# Patient Record
Sex: MALE | Race: WHITE | NOT HISPANIC OR LATINO | Employment: OTHER | URBAN - NONMETROPOLITAN AREA
[De-identification: names, ages, dates, MRNs, and addresses within clinical notes are randomized per-mention and may not be internally consistent; named-entity substitution may affect disease eponyms.]

---

## 2020-02-03 ENCOUNTER — OFFICE VISIT (OUTPATIENT)
Dept: CARDIOLOGY | Facility: CLINIC | Age: 50
End: 2020-02-03

## 2020-02-03 VITALS
SYSTOLIC BLOOD PRESSURE: 139 MMHG | OXYGEN SATURATION: 94 % | HEART RATE: 91 BPM | DIASTOLIC BLOOD PRESSURE: 92 MMHG | HEIGHT: 72 IN | BODY MASS INDEX: 32.78 KG/M2 | WEIGHT: 242 LBS

## 2020-02-03 DIAGNOSIS — R00.2 PALPITATIONS: ICD-10-CM

## 2020-02-03 DIAGNOSIS — I71.21 ASCENDING AORTIC ANEURYSM (HCC): ICD-10-CM

## 2020-02-03 DIAGNOSIS — R06.02 SHORTNESS OF BREATH: Primary | ICD-10-CM

## 2020-02-03 DIAGNOSIS — R07.9 CHEST PAIN, UNSPECIFIED TYPE: ICD-10-CM

## 2020-02-03 PROCEDURE — 99204 OFFICE O/P NEW MOD 45 MIN: CPT | Performed by: PHYSICIAN ASSISTANT

## 2020-02-03 RX ORDER — NITROGLYCERIN 0.4 MG/1
TABLET SUBLINGUAL
Qty: 25 TABLET | Refills: 11 | Status: SHIPPED | OUTPATIENT
Start: 2020-02-03

## 2020-02-03 RX ORDER — TIZANIDINE 4 MG/1
4 TABLET ORAL NIGHTLY PRN
COMMUNITY

## 2020-02-03 RX ORDER — AMLODIPINE BESYLATE 5 MG/1
5 TABLET ORAL DAILY
Qty: 30 TABLET | Refills: 11 | Status: SHIPPED | OUTPATIENT
Start: 2020-02-03

## 2020-02-03 RX ORDER — TRAZODONE HYDROCHLORIDE 50 MG/1
50 TABLET ORAL TAKE AS DIRECTED
COMMUNITY

## 2020-02-03 RX ORDER — HYDROCODONE BITARTRATE AND ACETAMINOPHEN 10; 325 MG/1; MG/1
1 TABLET ORAL 4 TIMES DAILY
COMMUNITY
End: 2020-10-19

## 2020-02-03 RX ORDER — ALBUTEROL SULFATE 90 UG/1
2 AEROSOL, METERED RESPIRATORY (INHALATION) EVERY 4 HOURS PRN
COMMUNITY

## 2020-02-03 NOTE — PROGRESS NOTES
Problem list     Subjective   Amanuel Hayes is a 49 y.o. male     Chief Complaint   Patient presents with   • Palpitations     has AAA   • Shortness of Breath   • Chest Pain   • Syncope     dizzy feeling; will cough so hard will pass out       HPI  ***      Current Outpatient Medications on File Prior to Visit   Medication Sig Dispense Refill   • albuterol sulfate  (90 Base) MCG/ACT inhaler Inhale 2 puffs Every 4 (Four) Hours As Needed for Wheezing.     • Fluticasone Furoate-Vilanterol (BREO ELLIPTA) 100-25 MCG/INH inhaler Inhale 1 puff Daily.     • HYDROcodone-acetaminophen (NORCO)  MG per tablet Take 1 tablet by mouth 4 (Four) Times a Day.     • tiZANidine (ZANAFLEX) 4 MG tablet Take 4 mg by mouth At Night As Needed for Muscle Spasms.     • traZODone (DESYREL) 50 MG tablet Take 50 mg by mouth Take As Directed. Half a tab at HS     • Umeclidinium Bromide (INCRUSE ELLIPTA) 62.5 MCG/INH aerosol powder  Inhale.       No current facility-administered medications on file prior to visit.        Penicillins; Neosporin [neomycin-bacitracin zn-polymyx]; and Sulfa antibiotics    Past Medical History:   Diagnosis Date   • Aneurysm (CMS/[facility])    • Asthma    • Cancer (CMS/[facility])     has nodule on b/l lungs/    • Coma (CMS/[facility]) 1999   • COPD (chronic obstructive pulmonary disease) (CMS/[facility])    • Hypertension        Social History     Socioeconomic History   • Marital status:      Spouse name: Not on file   • Number of children: Not on file   • Years of education: Not on file   • Highest education level: Not on file   Tobacco Use   • Smoking status: Current Every Day Smoker     Packs/day: 1.00     Types: Cigarettes   • Smokeless tobacco: Never Used   Substance and Sexual Activity   • Alcohol use: Yes   • Drug use: Defer   • Sexual activity: Defer       Family History   Problem Relation Age of Onset   • Cancer Mother    • Heart failure Mother    • Cancer Father    • Cancer Maternal Aunt    • Cancer Maternal  "Uncle    • Cancer Paternal Aunt    • Cancer Paternal Uncle    • Cancer Maternal Grandmother    • Cancer Maternal Grandfather    • Cancer Paternal Grandmother    • Cancer Paternal Grandfather        Review of Systems    Objective   Vitals:    02/03/20 1509   BP: 139/92   BP Location: Left arm   Patient Position: Sitting   Cuff Size: Adult   Pulse: 91   SpO2: 94%   Weight: 110 kg (242 lb)   Height: 182.9 cm (72\")      /92 (BP Location: Left arm, Patient Position: Sitting, Cuff Size: Adult)   Pulse 91   Ht 182.9 cm (72\")   Wt 110 kg (242 lb)   SpO2 94% Comment: No 02  BMI 32.82 kg/m²     Lab Results (most recent)     None          Physical Exam    Procedure   Procedures       Assessment/Plan     Problems Addressed this Visit     None                     Amanuel Hayes  reports that he has been smoking cigarettes. He has been smoking about 1.00 pack per day. He has never used smokeless tobacco.. I have educated him on the risk of diseases from using tobacco products such as {Tobacco Cessation Diseases:07001::\"cancer\",\"COPD\",\"heart diease\"}.     I advised him to quit and he is {Willing/Not Willing to Quit Tobacco Products:91484}.    I spent {Time Spent Tobacco :10832} minutes counseling the patient.          Patient's Body mass index is 32.82 kg/m². BMI is {BMI range:51721}.       Electronically signed by:    "

## 2020-02-03 NOTE — PROGRESS NOTES
Subjective   Amanuel Hayes is a 49 y.o. male     Chief Complaint   Patient presents with   • Palpitations     has AAA   • Shortness of Breath   • Chest Pain   • Syncope     dizzy feeling; will cough so hard will pass out   Problem list  1.  Ascending aortic aneurysm discovered December 2019 measuring 4.3 cm  2.  History of cardiac catheterization 2019 in [facility], inadequate data  3.  Pulmonary nodules followed by pulmonology soon to undergo bronchoscopy and biopsy by Dr. Ramirez February 2020  4.  Chronic tobacco use      HPI    Patient is a 49-year-old male that presents to the office to establish care.  Patient has been referred because of aneurysm but also because of symptoms.    Patient is doing poorly.  Over the last several months he has had respiratory issues.  He has been evaluated by pulmonology and is soon undergo bronchoscopy and biopsy because of pulmonary nodules and abnormalities on CT scan.  Incidental finding of aneurysm noted and patient has been referred.    He has had tightness and occasional sharp discomfort substernally.  This seemingly occurs at random.  It can occur at rest.  It has been associated with shortness of breath.  He has moderate levels of dyspnea when trying to exert or do activity.  He does not describe PND or orthopnea.    Occasionally notices palpitations.  He notices a fluttering sensation at baseline and cannot notice this more when exerting.  He has no presyncope or syncope.  He otherwise is doing well      Current Outpatient Medications   Medication Sig Dispense Refill   • albuterol sulfate  (90 Base) MCG/ACT inhaler Inhale 2 puffs Every 4 (Four) Hours As Needed for Wheezing.     • Fluticasone Furoate-Vilanterol (BREO ELLIPTA) 100-25 MCG/INH inhaler Inhale 1 puff Daily.     • HYDROcodone-acetaminophen (NORCO)  MG per tablet Take 1 tablet by mouth 4 (Four) Times a Day.     • tiZANidine (ZANAFLEX) 4 MG tablet Take 4 mg by mouth At Night As Needed for  Muscle Spasms.     • traZODone (DESYREL) 50 MG tablet Take 50 mg by mouth Take As Directed. Half a tab at HS     • Umeclidinium Bromide (INCRUSE ELLIPTA) 62.5 MCG/INH aerosol powder  Inhale.     • amLODIPine (NORVASC) 5 MG tablet Take 1 tablet by mouth Daily. 30 tablet 11   • nitroglycerin (NITROSTAT) 0.4 MG SL tablet 1 under the tongue as needed for angina, may repeat q5mins for up three doses 25 tablet 11     No current facility-administered medications for this visit.        Penicillins; Neosporin [neomycin-bacitracin zn-polymyx]; and Sulfa antibiotics    Past Medical History:   Diagnosis Date   • Aneurysm (CMS/[facility])    • Asthma    • Cancer (CMS/[facility])     has nodule on b/l lungs/    • Coma (CMS/[facility]) 1999   • COPD (chronic obstructive pulmonary disease) (CMS/[facility])    • Hypertension        Social History     Socioeconomic History   • Marital status:      Spouse name: Not on file   • Number of children: Not on file   • Years of education: Not on file   • Highest education level: Not on file   Tobacco Use   • Smoking status: Current Every Day Smoker     Packs/day: 1.00     Types: Cigarettes   • Smokeless tobacco: Never Used   Substance and Sexual Activity   • Alcohol use: Yes   • Drug use: Defer   • Sexual activity: Defer       Family History   Problem Relation Age of Onset   • Cancer Mother    • Heart failure Mother    • Cancer Father    • Cancer Maternal Aunt    • Cancer Maternal Uncle    • Cancer Paternal Aunt    • Cancer Paternal Uncle    • Cancer Maternal Grandmother    • Cancer Maternal Grandfather    • Cancer Paternal Grandmother    • Cancer Paternal Grandfather        Review of Systems   Constitutional: Positive for fatigue.   Respiratory: Positive for shortness of breath.    Cardiovascular: Positive for leg swelling. Negative for palpitations.   Neurological: Positive for dizziness, syncope and light-headedness. Negative for headaches.   All other systems reviewed and are negative.      Objective    "  Vitals:    02/03/20 1509   BP: 139/92   BP Location: Left arm   Patient Position: Sitting   Cuff Size: Adult   Pulse: 91   SpO2: 94%   Weight: 110 kg (242 lb)   Height: 182.9 cm (72\")      /92 (BP Location: Left arm, Patient Position: Sitting, Cuff Size: Adult)   Pulse 91   Ht 182.9 cm (72\")   Wt 110 kg (242 lb)   SpO2 94% Comment: No 02  BMI 32.82 kg/m²     Lab Results (most recent)     None          Physical Exam   Constitutional: He is oriented to person, place, and time. He appears well-developed and well-nourished. No distress.   HENT:   Head: Normocephalic and atraumatic.   Eyes: Conjunctivae are normal. Right eye exhibits no discharge. Left eye exhibits no discharge. No scleral icterus.   Neck: No JVD present.   Cardiovascular: Normal rate and regular rhythm. Exam reveals no gallop and no friction rub.   Murmur heard.   Systolic murmur is present with a grade of 1/6.  Grade 1/6 systolic murmur right upper sternal border   Pulmonary/Chest: Effort normal and breath sounds normal. No respiratory distress. He has no wheezes. He has no rales. He exhibits no tenderness.   Musculoskeletal: Normal range of motion. He exhibits no edema.   Neurological: He is alert and oriented to person, place, and time. No cranial nerve deficit.   Skin: Skin is warm and dry. No rash noted. No erythema. No pallor.   Psychiatric: He has a normal mood and affect. His behavior is normal.   Nursing note and vitals reviewed.      Procedure     ECG 12 Lead  Date/Time: 2/4/2020 3:55 PM  Performed by: Brian Whyte PA  Authorized by: Brian Whyte PA   Comparison: not compared with previous ECG   Comments: EKG demonstrates sinus rhythm at 76 bpm and otherwise normal                 Assessment/Plan     Problems Addressed this Visit        Cardiovascular and Mediastinum    Ascending aortic aneurysm (CMS/HCC)    Relevant Orders    Adult Transthoracic Echo Complete W/ Cont if Necessary Per Protocol    Stress Test With " Myocardial Perfusion One Day    Palpitations    Relevant Orders    Adult Transthoracic Echo Complete W/ Cont if Necessary Per Protocol    Stress Test With Myocardial Perfusion One Day       Respiratory    Shortness of breath - Primary    Relevant Orders    Adult Transthoracic Echo Complete W/ Cont if Necessary Per Protocol    Stress Test With Myocardial Perfusion One Day       Nervous and Auditory    Chest pain    Relevant Orders    Adult Transthoracic Echo Complete W/ Cont if Necessary Per Protocol    Stress Test With Myocardial Perfusion One Day            Recommendation  1.  Patient is a 49-year-old male with an ascending aortic aneurysm measuring 4.3 cm with no evidence of dissecting segment.  We discussed about the risk factors of aneurysm and discussed smoking cessation.  I also discussed blood pressure control.  Patient describes her systolic blood pressures in the 140s and 50s.  I would like to place her on a low-dose calcium channel blocker.  Patient apparently has had issue with medication.  He was on beta-blocker therapy that apparently caused bronchospasm significant cough.  He will monitor blood pressure  2.  I will repeat testing.  Echocardiogram can hopefully help us gauge an estimate of aortic diameter.  He will likely need CT scan surveillance in the next few months.  3.  We will see patient back for follow-up after testing.  I am prescribing nitroglycerin as needed for chest pain.  I have counseled him on how to use that medication.  We will see him back for follow-up on testing.  He will follow with primary as scheduled              Electronically signed by:

## 2020-02-04 PROCEDURE — 93000 ELECTROCARDIOGRAM COMPLETE: CPT | Performed by: PHYSICIAN ASSISTANT

## 2020-02-20 ENCOUNTER — APPOINTMENT (OUTPATIENT)
Dept: CARDIOLOGY | Facility: HOSPITAL | Age: 50
End: 2020-02-20

## 2020-03-17 ENCOUNTER — APPOINTMENT (OUTPATIENT)
Dept: CARDIOLOGY | Facility: HOSPITAL | Age: 50
End: 2020-03-17

## 2020-04-09 ENCOUNTER — TELEPHONE (OUTPATIENT)
Dept: CARDIOLOGY | Facility: CLINIC | Age: 50
End: 2020-04-09

## 2020-04-09 DIAGNOSIS — I71.21 ASCENDING AORTIC ANEURYSM (HCC): Primary | ICD-10-CM

## 2020-04-09 DIAGNOSIS — R06.02 SHORTNESS OF BREATH: ICD-10-CM

## 2020-04-09 DIAGNOSIS — R07.9 CHEST PAIN, UNSPECIFIED TYPE: ICD-10-CM

## 2020-04-09 DIAGNOSIS — R00.2 PALPITATIONS: ICD-10-CM

## 2020-04-09 NOTE — TELEPHONE ENCOUNTER
Called patient with phone number for scheduling. Order for new echo has been placed. Stress test can be rescheduled. Jennifer Tineo MA      ----- Message from Edmar Chou sent at 4/9/2020 11:59 AM EDT -----  Pt has not had testing yet can that be reordered?

## 2020-09-23 ENCOUNTER — TELEPHONE (OUTPATIENT)
Dept: CARDIOLOGY | Facility: CLINIC | Age: 50
End: 2020-09-23

## 2020-09-23 DIAGNOSIS — R06.02 SHORTNESS OF BREATH: ICD-10-CM

## 2020-09-23 DIAGNOSIS — I71.21 ASCENDING AORTIC ANEURYSM (HCC): Primary | ICD-10-CM

## 2020-09-23 DIAGNOSIS — R07.9 CHEST PAIN, UNSPECIFIED TYPE: ICD-10-CM

## 2020-09-23 DIAGNOSIS — R00.2 PALPITATIONS: ICD-10-CM

## 2020-09-23 NOTE — TELEPHONE ENCOUNTER
----- Message from Carleen Figueroa sent at 9/23/2020  2:49 PM EDT -----  I just looked and there is an order for a stress from Feb but no echo orders that are available to schedule from.    ----- Message -----  From: Shaylee Bailey  Sent: 9/23/2020   2:25 PM EDT  To: Carleen Figueroa    Pt has recurrently cx his testing appt, how do I tell if he needs another order?

## 2020-10-19 ENCOUNTER — OFFICE VISIT (OUTPATIENT)
Dept: CARDIOLOGY | Facility: CLINIC | Age: 50
End: 2020-10-19

## 2020-10-19 VITALS
DIASTOLIC BLOOD PRESSURE: 74 MMHG | HEART RATE: 73 BPM | HEIGHT: 72 IN | SYSTOLIC BLOOD PRESSURE: 108 MMHG | TEMPERATURE: 96.9 F | WEIGHT: 227 LBS | BODY MASS INDEX: 30.75 KG/M2

## 2020-10-19 DIAGNOSIS — R00.2 PALPITATIONS: ICD-10-CM

## 2020-10-19 DIAGNOSIS — I71.21 ASCENDING AORTIC ANEURYSM (HCC): Primary | ICD-10-CM

## 2020-10-19 DIAGNOSIS — R07.2 PRECORDIAL CHEST PAIN: ICD-10-CM

## 2020-10-19 DIAGNOSIS — R55 SYNCOPE AND COLLAPSE: ICD-10-CM

## 2020-10-19 DIAGNOSIS — R06.02 SHORTNESS OF BREATH: ICD-10-CM

## 2020-10-19 PROCEDURE — 99214 OFFICE O/P EST MOD 30 MIN: CPT | Performed by: NURSE PRACTITIONER

## 2020-10-19 RX ORDER — OXYCODONE AND ACETAMINOPHEN 7.5; 325 MG/1; MG/1
1 TABLET ORAL 4 TIMES DAILY
COMMUNITY
Start: 2020-10-06

## 2020-10-19 RX ORDER — FLUCONAZOLE 200 MG/1
200 TABLET ORAL DAILY
COMMUNITY
Start: 2020-09-22 | End: 2021-01-19

## 2020-10-19 RX ORDER — GABAPENTIN 800 MG/1
800 TABLET ORAL 3 TIMES DAILY
COMMUNITY
Start: 2020-10-06

## 2020-10-19 NOTE — PATIENT INSTRUCTIONS
Acute Coronary Syndrome  Acute coronary syndrome (ACS) is a serious problem in which there is suddenly not enough blood and oxygen reaching the heart. ACS can result in chest pain or a heart attack.  This condition is a medical emergency. If you have any symptoms of this condition, get help right away.  What are the causes?  This condition may be caused by:  · A buildup of fat and cholesterol inside the arteries (atherosclerosis). This is the most common cause. The buildup (plaque) can cause blood vessels in the heart (coronary arteries) to become narrow or blocked, which reduces blood flow to the heart. Plaque can also break off and lead to a clot, which can block an artery and cause a heart attack or stroke.  · Sudden tightening of the muscles around the coronary arteries (coronary spasm).  · Tearing of a coronary artery (spontaneous coronary artery dissection).  · Very low blood pressure (hypotension).  · An abnormal heartbeat (arrhythmia).  · Other medical conditions that cause a decrease of oxygen to the heart, such as anemiaorrespiratory failure.  · Using cocaine or methamphetamine.  What increases the risk?  The following factors may make you more likely to develop this condition:  · Age. The risk for ACS increases as you get older.  · History of chest pain, heart attack, peripheral artery disease, or stroke.  · Having taken chemotherapy or immune-suppressing medicines.  · Being male.  · Family history of chest pain, heart disease, or stroke.  · Smoking.  · Not exercising enough.  · Being overweight.  · High cholesterol.  · High blood pressure (hypertension).  · Diabetes.  · Excessive alcohol use.  What are the signs or symptoms?  Common symptoms of this condition include:  · Chest pain. The pain may last a long time, or it may stop and come back (recur). It may feel like:  ? Crushing or squeezing.  ? Tightness, pressure, fullness, or heaviness.  · Arm, neck, jaw, or back pain.  · Heartburn or  indigestion.  · Shortness of breath.  · Nausea.  · Sudden cold sweats.  · Light-headedness.  · Dizziness or passing out.  · Tiredness (fatigue).  Sometimes there are no symptoms.  How is this diagnosed?  This condition may be diagnosed based on:  · Your medical history and symptoms.  · Imaging tests, such as:  ? An electrocardiogram (ECG). This measures the heart's electrical activity.  ? X-rays.  ? CT scan.  ? A coronary angiogram. For this test, dye is injected into the heart arteries and then X-rays are taken.  ? Myocardial perfusion imaging. This test shows how well blood flows through your heart muscle.  · Blood tests. These may be repeated at certain time intervals.  · Exercise stress testing.  · Echocardiogram. This is a test that uses sound waves to produce detailed images of the heart.  How is this treated?  Treatment for this condition may include:  · Oxygen therapy.  · Medicines, such as:  ? Antiplatelet medicines and blood-thinning medicines, such as aspirin. These help prevent blood clots.  ? Medicine that dissolves any blood clots (fibrinolytic therapy).  ? Blood pressure medicines.  ? Nitroglycerin. This helps widen blood vessels to improve blood flow.  ? Pain medicine.  ? Cholesterol-lowering medicine.  · Surgery, such as:  ? Coronary angioplasty with stent placement. This involves placing a small piece of metal that looks like mesh or a spring into a narrow coronary artery. This widens the artery and keeps it open.  ? Coronary artery bypass surgery. This involves taking a section of a blood vessel from a different part of your body and placing it on the blocked coronary artery to allow blood to flow around the blockage.  · Cardiac rehabilitation. This is a program that includes exercise training, education, and counseling to help you recover.  Follow these instructions at home:  Eating and drinking  · Eat a heart-healthy diet that includes whole grains, fruits and vegetables, lean proteins, and  low-fat or nonfat dairy products.  · Limit how much salt (sodium) you eat as told by your health care provider. Follow instructions from your health care provider about any other eating or drinking restrictions, such as limiting foods that are high in fat and processed sugars.  · Use healthy cooking methods such as roasting, grilling, broiling, baking, poaching, steaming, or stir-frying.  · Work with a dietitian to follow a heart-healthy eating plan.  Medicines  · Take over-the-counter and prescription medicines only as told by your health care provider.  · Do not take these medicines unless your health care provider approves:  ? Vitamin supplements that contain vitamin A or vitamin E.  ? NSAIDs, such as ibuprofen, naproxen, or celecoxib.  ? Hormone replacement therapy that contains estrogen.  · If you are taking blood thinners:  ? Talk with your health care provider before you take any medicines that contain aspirin or NSAIDs. These medicines increase your risk for dangerous bleeding.  ? Take your medicine exactly as told, at the same time every day.  ? Avoid activities that could cause injury or bruising, and follow instructions about how to prevent falls.  ? Wear a medical alert bracelet, and carry a card that lists what medicines you take.  Activity  · Follow your cardiac rehabilitation program. Do exercises as told by your physical therapist.  · Ask your health care provider what activities and exercises are safe for you. Follow his or her instructions about lifting, driving, or climbing stairs.  Lifestyle  · Do not use any products that contain nicotine or tobacco, such as cigarettes, e-cigarettes, and chewing tobacco. If you need help quitting, ask your health care provider.  · Do not drink alcohol if your health care provider tells you not to drink.  · If you drink alcohol:  ? Limit how much you have to 0-1 drink a day.  ? Be aware of how much alcohol is in your drink. In the [place] one drink equals one 12 oz  bottle of beer (355 mL), one 5 oz glass of wine (148 mL), or one 1½ oz glass of hard liquor (44 mL).  · Maintain a healthy weight. If you need to lose weight, work with your health care provider to do so safely.  General instructions  · Tell all the health care providers who provide care for you about your heart condition, including your dentist. This may affect the medicines or treatment you receive.  · Manage any other health conditions you have, such as hypertension or diabetes. These conditions affect your heart.  · Pay attention to your mental health. You may be at higher risk for depression.  ? Find ways to manage stress.  ? Talk to your health care provider about depression screening and treatment.  · Keep your vaccinations up to date.  ? Get the flu shot (influenza vaccine) every year.  ? Get the pneumococcal vaccine if you are age 65 or older.  · If directed, monitor your blood pressure at home.  · Keep all follow-up visits as told by your health care provider. This is important.  Contact a health care provider if you:  · Feel overwhelmed or sad.  · Have trouble doing your daily activities.  Get help right away if you:  · Have pain in your chest, neck, arm, jaw, stomach, or back that recurs, and:  ? It lasts for more than a few minutes.  ? It is not relieved by taking the medicineyour health care provider prescribed.  · Have unexplained:  ? Heavy sweating.  ? Heartburn or indigestion.  ? Nausea or vomiting.  ? Shortness of breath.  ? Difficulty breathing.  ? Fatigue.  ? Nervousness or anxiety.  ? Weakness.  ? Diarrhea.  ? Dark stools or blood in your stool.  · Have sudden light-headedness or dizziness.  · Have blood pressure that is higher than 180/120.  · Faint.  · Have thoughts about hurting yourself.  These symptoms may represent a serious problem that is an emergency. Do not wait to see if the symptoms will go away. Get medical help right away. Call your local emergency services (911 in the [place] Do  not drive yourself to the hospital.   Summary  · Acute coronary syndrome (ACS) is when there is not enough blood and oxygen being supplied to the heart. ACS can result in chest pain or a heart attack.  · Acute coronary syndrome is a medical emergency. If you have any symptoms of this condition, get help right away.  · Treatment includes medicines and procedures to open the blocked arteries and restore blood flow.  This information is not intended to replace advice given to you by your health care provider. Make sure you discuss any questions you have with your health care provider.  Document Released: 12/18/2006 Document Revised: 05/20/2020 Document Reviewed: 12/30/2019  [employer]vier Patient Education © 2020 [employer]

## 2020-10-19 NOTE — PROGRESS NOTES
Subjective     Amanuel Hayes is a 49 y.o. male who presents to day for ascending aortic aneurysm, Chest Pain (at times), Shortness of Breath, and Histoplasmosis (Dx in July).    CHIEF COMPLIANT  Chief Complaint   Patient presents with   • ascending aortic aneurysm   • Chest Pain     at times   • Shortness of Breath   • Histoplasmosis     Dx in July       Active Problems:  Problem List Items Addressed This Visit        Cardiovascular and Mediastinum    Ascending aortic aneurysm (CMS/HCC) - Primary    Relevant Orders    Stress Test With Myocardial Perfusion One Day    Adult Transthoracic Echo Complete W/ Cont if Necessary Per Protocol    Duplex Carotid Ultrasound CAR    Cardiac Event Monitor    Palpitations    Relevant Orders    Stress Test With Myocardial Perfusion One Day    Adult Transthoracic Echo Complete W/ Cont if Necessary Per Protocol    Duplex Carotid Ultrasound CAR    Cardiac Event Monitor       Respiratory    Shortness of breath    Relevant Orders    Stress Test With Myocardial Perfusion One Day    Adult Transthoracic Echo Complete W/ Cont if Necessary Per Protocol    Duplex Carotid Ultrasound CAR    Cardiac Event Monitor      Other Visit Diagnoses     Precordial chest pain        Relevant Orders    Stress Test With Myocardial Perfusion One Day    Adult Transthoracic Echo Complete W/ Cont if Necessary Per Protocol    Duplex Carotid Ultrasound CAR    Cardiac Event Monitor    Syncope and collapse        Relevant Orders    Stress Test With Myocardial Perfusion One Day    Adult Transthoracic Echo Complete W/ Cont if Necessary Per Protocol    Duplex Carotid Ultrasound CAR    Cardiac Event Monitor      Problem list  1.  Ascending aortic aneurysm discovered December 2019 measuring 4.3 cm  2.  History of cardiac catheterization 2019 in [facility], inadequate data  3.  Pulmonary nodules biopsied as histoplasmosis in July  4.  Chronic tobacco use    HPI  HPI  Mr. Hayes is a 49-year-old male patient who is  being followed up today for chest pain and shortness of breath.  Patient has had chest pain for approximately 1 year that is midsternal and occurs approximately times a week and intermittent in nature.  He describes as a tightness.  He has associated shortness of breath diaphoresis and bilateral hands go numb.  This is usually associated with activity.  He has to lay down to rest to get the pain to reside.  He has not taken any nitroglycerin.  He says it does get quite severe at times.  He also has a history of COPD and asthma and complains of chronic shortness of breath where he feels as he can get area or get air out.  It is constant in nature and occurs with rest and with activity.  It is exacerbated with activity.  He does have a productive cough with grayish-white sputum.  Even becomes presyncopal at times with coughing and other times with rest.  He says nebs and breathing treatments help with the shortness of breath somewhat.  It does get quite severe.  He does have chronic fatigue when he is tired all the time.  He is followed by Dr. Ramirez for his shortness of air.  Patient also has lower extremity edema that occurs on occasion and is intermittent.  Is usually isolated to his feet and ankles.  Patient also reports intermittent syncope usually when he sat on the couch not doing anything or after coughing episode.  He has had numerous occasions of this.  He has not lost bowel or bladder control.  He does not convulse per his wife.  Last anywhere from a few seconds to minutes and when he comes back around he is really shaky and that he comes back to himself.  Patient also has palpitations where he races and it slows down this is intermittently and occurs with dizziness.  This happens at random and commonly after coughing fits.  Patient denies any other neurological changes.  PRIOR MEDS  Current Outpatient Medications on File Prior to Visit   Medication Sig Dispense Refill   • albuterol sulfate  (90 Base)  MCG/ACT inhaler Inhale 2 puffs Every 4 (Four) Hours As Needed for Wheezing.     • amLODIPine (NORVASC) 5 MG tablet Take 1 tablet by mouth Daily. 30 tablet 11   • fluconazole (DIFLUCAN) 200 MG tablet Take 200 mg by mouth Daily.     • Fluticasone Furoate-Vilanterol (BREO ELLIPTA) 100-25 MCG/INH inhaler Inhale 1 puff Daily.     • gabapentin (NEURONTIN) 600 MG tablet Take 600 mg by mouth 3 (Three) Times a Day.     • nitroglycerin (NITROSTAT) 0.4 MG SL tablet 1 under the tongue as needed for angina, may repeat q5mins for up three doses 25 tablet 11   • oxyCODONE-acetaminophen (PERCOCET) 7.5-325 MG per tablet Take 1 tablet by mouth 4 (Four) Times a Day.     • tiZANidine (ZANAFLEX) 4 MG tablet Take 4 mg by mouth At Night As Needed for Muscle Spasms.     • traZODone (DESYREL) 50 MG tablet Take 50 mg by mouth Take As Directed. Half a tab at HS     • Umeclidinium Bromide (INCRUSE ELLIPTA) 62.5 MCG/INH aerosol powder  Inhale Daily.     • [DISCONTINUED] HYDROcodone-acetaminophen (NORCO)  MG per tablet Take 1 tablet by mouth 4 (Four) Times a Day.       No current facility-administered medications on file prior to visit.        ALLERGIES  Penicillins, Neosporin [neomycin-bacitracin zn-polymyx], and Sulfa antibiotics    HISTORY  Past Medical History:   Diagnosis Date   • Aneurysm (CMS/[facility])    • Asthma    • Cancer (CMS/[facility])     has nodule on b/l lungs/    • Coma (CMS/[facility]) 1999   • COPD (chronic obstructive pulmonary disease) (CMS/[facility])    • Histoplasmosis 2020   • Hypertension        Social History     Socioeconomic History   • Marital status:      Spouse name: Not on file   • Number of children: Not on file   • Years of education: Not on file   • Highest education level: Not on file   Tobacco Use   • Smoking status: Current Every Day Smoker     Packs/day: 1.00     Types: Cigarettes   • Smokeless tobacco: Never Used   Substance and Sexual Activity   • Alcohol use: Yes   • Drug use: Defer   • Sexual activity: Defer        "      Family History   Problem Relation Age of Onset   • Cancer Mother    • Heart failure Mother    • Cancer Father    • Cancer Maternal Aunt    • Cancer Maternal Uncle    • Cancer Paternal Aunt    • Cancer Paternal Uncle    • Cancer Maternal Grandmother    • Cancer Maternal Grandfather    • Cancer Paternal Grandmother    • Cancer Paternal Grandfather        Review of Systems   Constitutional: Positive for fatigue. Negative for chills and fever.   HENT: Negative.  Negative for congestion, sinus pressure and sore throat.    Eyes: Negative.  Negative for visual disturbance.   Respiratory: Positive for cough, chest tightness (at times) and shortness of breath (all the times).    Cardiovascular: Positive for chest pain (at times), palpitations (races) and leg swelling (at times).   Gastrointestinal: Negative.  Negative for abdominal pain, blood in stool, constipation, diarrhea, nausea and vomiting.   Endocrine: Negative.  Negative for cold intolerance and heat intolerance.   Genitourinary: Negative.  Negative for dysuria, frequency, hematuria and urgency.   Musculoskeletal: Positive for arthralgias, back pain and neck pain.   Skin: Negative.  Negative for rash and wound.   Allergic/Immunologic: Negative.  Negative for environmental allergies and food allergies.   Neurological: Positive for syncope. Negative for dizziness and light-headedness.   Hematological: Negative.  Does not bruise/bleed easily.   Psychiatric/Behavioral: Positive for sleep disturbance (wakes coughing, cp at times).       Objective     VITALS: /74 (BP Location: Left arm, Patient Position: Sitting)   Pulse 73   Temp 96.9 °F (36.1 °C)   Ht 182.9 cm (72\")   Wt 103 kg (227 lb)   BMI 30.79 kg/m²     LABS:   Lab Results (most recent)     None          IMAGING:   No Images in the past 120 days found..    EXAM:  Physical Exam  Vitals signs and nursing note reviewed.   Constitutional:       Appearance: He is well-developed.   HENT:      Head: " Normocephalic and atraumatic.   Eyes:      Pupils: Pupils are equal, round, and reactive to light.   Neck:      Musculoskeletal: Neck supple.      Thyroid: No thyroid mass.      Vascular: No carotid bruit or JVD.      Trachea: Trachea and phonation normal.   Cardiovascular:      Rate and Rhythm: Normal rate and regular rhythm.      Pulses:           Radial pulses are 2+ on the right side and 2+ on the left side.      Heart sounds: Normal heart sounds. No murmur. No friction rub. No gallop.    Pulmonary:      Effort: Pulmonary effort is normal. No respiratory distress.      Breath sounds: Wheezing present. No rales.   Abdominal:      General: Bowel sounds are normal. There is no distension or abdominal bruit.      Palpations: Abdomen is soft.      Tenderness: There is no abdominal tenderness.   Musculoskeletal: Normal range of motion.         General: Swelling (trace) present.   Skin:     General: Skin is warm and dry.      Capillary Refill: Capillary refill takes less than 2 seconds.      Findings: No rash.   Neurological:      Mental Status: He is alert and oriented to person, place, and time.      Cranial Nerves: No cranial nerve deficit.      Sensory: No sensory deficit.   Psychiatric:         Mood and Affect: Mood normal.         Speech: Speech normal.         Behavior: Behavior normal.         Thought Content: Thought content normal.         Judgment: Judgment normal.         Procedure   Procedures       Assessment/Plan    Diagnosis Plan   1. Ascending aortic aneurysm (CMS/HCC)  Stress Test With Myocardial Perfusion One Day    Adult Transthoracic Echo Complete W/ Cont if Necessary Per Protocol    Duplex Carotid Ultrasound CAR    Cardiac Event Monitor   2. Palpitations  Stress Test With Myocardial Perfusion One Day    Adult Transthoracic Echo Complete W/ Cont if Necessary Per Protocol    Duplex Carotid Ultrasound CAR    Cardiac Event Monitor   3. Shortness of breath  Stress Test With Myocardial Perfusion One Day     Adult Transthoracic Echo Complete W/ Cont if Necessary Per Protocol    Duplex Carotid Ultrasound CAR    Cardiac Event Monitor   4. Precordial chest pain  Stress Test With Myocardial Perfusion One Day    Adult Transthoracic Echo Complete W/ Cont if Necessary Per Protocol    Duplex Carotid Ultrasound CAR    Cardiac Event Monitor   5. Syncope and collapse  Stress Test With Myocardial Perfusion One Day    Adult Transthoracic Echo Complete W/ Cont if Necessary Per Protocol    Duplex Carotid Ultrasound CAR    Cardiac Event Monitor   1.  Patient does have an ascending aortic aneurysm that has not been evaluated for approximately 1 year.  He is scheduled to have a CT on the 23rd for follow-up of his lungs.  His aneurysm will also be assessed at that time.  Previously was 4.3 cm.  2.  Palpitations that usually occur with coughing.  I do not think patient is a candidate for beta-blocker therapy due to the extent of his shortness of breath and history of asthma.  These seem to be limited and do not affect patient's quality of life.  We will do a cardiac event monitor for 14 days to further evaluate.  3.  Patient does have chronic shortness of breath which is severe and has a recent diagnosis of histoplasmosis and along with his COPD and asthma.  He is wheezing.  He does follow with Dr. Ramirez.  I do think it is appropriate to move forth with an ischemic work-up due to combination of chest pain syncope and shortness of breath.  Patient will undergo stress test, echocardiogram, carotid duplex for evaluation of the symptoms.  He was informed that the benefits and risk of the procedure and wishes to proceed.  These were previously scheduled the patient was unable to make the appointment.  4.  Also due to the syncope I do feel it is appropriate to order a cardiac event monitor 14 days to help determine etiology of his palpitations and potential reasons why he becomes syncopal.  I will will also get a carotid duplex to help  determine the potential causes of patient's syncope.  5.  Patient's blood pressure is well controlled on current blood pressure medication regimen.  No medication changes are warranted at this time.  Patient advised to monitor blood pressure on a daily basis and report any persistent highs or lows.  Set goal blood pressure for patient at 130/80 or below.  6.  Informed of signs and symptoms of ACS and advised to seek emergent treatment for any new worsening symptoms.  Patient also advised sooner follow-up as needed.  Also advised to follow-up with family doctor as needed  This note is dictated utilizing voice recognition software.  Although this record has been proof read, transcriptional errors may still be present. If questions occur regarding the content of this record please do not hesitate to call our office.  I have reviewed and confirmed the accuracy of the ROS as documented by the MA/LPN/RN ANITHA Cronin    Return in about 3 months (around 1/19/2021), or if symptoms worsen or fail to improve.    Diagnoses and all orders for this visit:    1. Ascending aortic aneurysm (CMS/HCC) (Primary)  -     Stress Test With Myocardial Perfusion One Day; Future  -     Adult Transthoracic Echo Complete W/ Cont if Necessary Per Protocol; Future  -     Duplex Carotid Ultrasound CAR; Future  -     Cardiac Event Monitor; Future    2. Palpitations  -     Stress Test With Myocardial Perfusion One Day; Future  -     Adult Transthoracic Echo Complete W/ Cont if Necessary Per Protocol; Future  -     Duplex Carotid Ultrasound CAR; Future  -     Cardiac Event Monitor; Future    3. Shortness of breath  -     Stress Test With Myocardial Perfusion One Day; Future  -     Adult Transthoracic Echo Complete W/ Cont if Necessary Per Protocol; Future  -     Duplex Carotid Ultrasound CAR; Future  -     Cardiac Event Monitor; Future    4. Precordial chest pain  -     Stress Test With Myocardial Perfusion One Day; Future  -     Adult  Transthoracic Echo Complete W/ Cont if Necessary Per Protocol; Future  -     Duplex Carotid Ultrasound CAR; Future  -     Cardiac Event Monitor; Future    5. Syncope and collapse  -     Stress Test With Myocardial Perfusion One Day; Future  -     Adult Transthoracic Echo Complete W/ Cont if Necessary Per Protocol; Future  -     Duplex Carotid Ultrasound CAR; Future  -     Cardiac Event Monitor; Future        Amanuel Hayes  reports that he has been smoking cigarettes. He has been smoking about 1.00 pack per day. He has never used smokeless tobacco.. I have educated him on the risk of diseases from using tobacco products such as cancer, COPD and heart disease.     I advised him to quit and he is not willing to quit.    I spent 3  minutes counseling the patient.           Patient's Body mass index is 30.79 kg/m². BMI is above normal parameters. Recommendations include: educational material.           MEDS ORDERED DURING VISIT:  No orders of the defined types were placed in this encounter.          This document has been electronically signed by Yandel Tomas Jr., APRSIVAKUMAR  October 19, 2020 10:03 EDT

## 2020-10-20 ENCOUNTER — TELEPHONE (OUTPATIENT)
Dept: CARDIOLOGY | Facility: CLINIC | Age: 50
End: 2020-10-20

## 2020-10-20 NOTE — TELEPHONE ENCOUNTER
Spoke w/ patient regarding chest CT w/ contrast.  Since Dr Ramirez has ordered a chest CT w/o contrast, an authorization has already been submitted w/ insurance company.  Our office will not be able to obtain another authorization for chest CT w/ contrast at this time.  Pt made aware he will need to contact office of Dr Ramirez to either 1: have Dr Ramirez do addition of contrast to already ordered chest CT or 2:  Cancel ordered test, contact insurance company and withdraw authorization in order for our office to obtain authorization.  Pt states he will contact Dr Ramirez office to see if he will do additional test with contrast.  He will contact our office with information regarding further testing.

## 2020-10-20 NOTE — TELEPHONE ENCOUNTER
Pt wife, Sherrie, contacts office stating pt is scheduled for chest CT w/o contrast to be done tomorrow.  States pt was told to contact our office today to change chest CT to be done w/ contrast in order to assess aneurysm size.  Do you want chest CT w/ contrast instead of w/o contrast as previously ordered by Dr Ramirez?

## 2020-11-10 ENCOUNTER — OUTSIDE FACILITY SERVICE (OUTPATIENT)
Dept: CARDIOLOGY | Facility: CLINIC | Age: 50
End: 2020-11-10

## 2020-11-10 PROCEDURE — 93228 REMOTE 30 DAY ECG REV/REPORT: CPT | Performed by: INTERNAL MEDICINE

## 2020-11-12 ENCOUNTER — TELEPHONE (OUTPATIENT)
Dept: CARDIOLOGY | Facility: CLINIC | Age: 50
End: 2020-11-12

## 2020-11-12 NOTE — TELEPHONE ENCOUNTER
Patient informed of results, informed patient ARNALDOEMILYMati WELSH would discuss further at follow up visit.  Renea Burns LPN      ----- Message from ANITHA Cronin sent at 11/9/2020  5:49 PM EST -----  Patient did have mild inflammatory changes in his lungs.  However the ectasia of his aorta is at 4.3 cm.  ----- Message -----  From: Humberto Esparza MD  Sent: 10/27/2020   2:16 PM EST  To: ANITHA Cronin      ----- Message -----  From: Graciela, Tory Incoming  Sent: 10/27/2020  11:58 AM EDT  To: Humberto Esparza MD

## 2020-12-02 ENCOUNTER — APPOINTMENT (OUTPATIENT)
Dept: CARDIOLOGY | Facility: HOSPITAL | Age: 50
End: 2020-12-02

## 2021-01-11 ENCOUNTER — HOSPITAL ENCOUNTER (OUTPATIENT)
Dept: CARDIOLOGY | Facility: HOSPITAL | Age: 51
Discharge: HOME | End: 2021-01-11

## 2021-01-11 VITALS — WEIGHT: 227.07 LBS | HEIGHT: 72 IN | BODY MASS INDEX: 30.76 KG/M2

## 2021-01-11 DIAGNOSIS — R06.02 SHORTNESS OF BREATH: ICD-10-CM

## 2021-01-11 DIAGNOSIS — R00.2 PALPITATIONS: ICD-10-CM

## 2021-01-11 DIAGNOSIS — R07.2 PRECORDIAL CHEST PAIN: ICD-10-CM

## 2021-01-11 DIAGNOSIS — I71.21 ASCENDING AORTIC ANEURYSM (HCC): ICD-10-CM

## 2021-01-11 DIAGNOSIS — R55 SYNCOPE AND COLLAPSE: ICD-10-CM

## 2021-01-11 PROCEDURE — 93018 CV STRESS TEST I&R ONLY: CPT | Performed by: INTERNAL MEDICINE

## 2021-01-11 PROCEDURE — 78452 HT MUSCLE IMAGE SPECT MULT: CPT

## 2021-01-11 PROCEDURE — 93306 TTE W/DOPPLER COMPLETE: CPT

## 2021-01-11 PROCEDURE — 0 TECHNETIUM SESTAMIBI: Performed by: INTERNAL MEDICINE

## 2021-01-11 PROCEDURE — 93880 EXTRACRANIAL BILAT STUDY: CPT

## 2021-01-11 PROCEDURE — 93356 MYOCRD STRAIN IMG SPCKL TRCK: CPT | Performed by: INTERNAL MEDICINE

## 2021-01-11 PROCEDURE — 78452 HT MUSCLE IMAGE SPECT MULT: CPT | Performed by: INTERNAL MEDICINE

## 2021-01-11 PROCEDURE — 93017 CV STRESS TEST TRACING ONLY: CPT

## 2021-01-11 PROCEDURE — 93880 EXTRACRANIAL BILAT STUDY: CPT | Performed by: INTERNAL MEDICINE

## 2021-01-11 PROCEDURE — 25010000002 REGADENOSON 0.4 MG/5ML SOLUTION: Performed by: INTERNAL MEDICINE

## 2021-01-11 PROCEDURE — 93356 MYOCRD STRAIN IMG SPCKL TRCK: CPT

## 2021-01-11 PROCEDURE — A9500 TC99M SESTAMIBI: HCPCS | Performed by: INTERNAL MEDICINE

## 2021-01-11 PROCEDURE — 93016 CV STRESS TEST SUPVJ ONLY: CPT | Performed by: NURSE PRACTITIONER

## 2021-01-11 PROCEDURE — 93306 TTE W/DOPPLER COMPLETE: CPT | Performed by: INTERNAL MEDICINE

## 2021-01-11 RX ADMIN — TECHNETIUM TC 99M SESTAMIBI 1 DOSE: 1 INJECTION INTRAVENOUS at 10:30

## 2021-01-11 RX ADMIN — REGADENOSON 0.4 MG: 0.08 INJECTION, SOLUTION INTRAVENOUS at 10:30

## 2021-01-11 RX ADMIN — TECHNETIUM TC 99M SESTAMIBI 1 DOSE: 1 INJECTION INTRAVENOUS at 10:29

## 2021-01-12 LAB
BH CV STRESS COMMENTS STAGE 1: NORMAL
BH CV STRESS DOSE REGADENOSON STAGE 1: 0.4
BH CV STRESS DURATION MIN STAGE 1: 0
BH CV STRESS DURATION SEC STAGE 1: 10
BH CV STRESS PROTOCOL 1: NORMAL
BH CV STRESS RECOVERY BP: NORMAL MMHG
BH CV STRESS RECOVERY HR: 88 BPM
BH CV STRESS STAGE 1: 1
MAXIMAL PREDICTED HEART RATE: 171 BPM
PERCENT MAX PREDICTED HR: 53.22 %
STRESS BASELINE BP: NORMAL MMHG
STRESS BASELINE HR: 75 BPM
STRESS PERCENT HR: 63 %
STRESS POST PEAK BP: NORMAL MMHG
STRESS POST PEAK HR: 91 BPM
STRESS TARGET HR: 145 BPM

## 2021-01-14 ENCOUNTER — TELEPHONE (OUTPATIENT)
Dept: CARDIOLOGY | Facility: CLINIC | Age: 51
End: 2021-01-14

## 2021-01-14 NOTE — TELEPHONE ENCOUNTER
----- Message from Renea Burns LPN sent at 1/14/2021  5:21 PM EST -----    ----- Message -----  From: Yandel Tomas APRN  Sent: 1/13/2021   8:19 AM EST  To: Renea Burns LPN    Normal stress test.  Keep follow-up.  ----- Message -----  From: Humberto Esparza MD  Sent: 1/12/2021  11:15 PM EST  To: ANITHA Cronin

## 2021-01-16 LAB
AORTIC DIMENSIONLESS INDEX: 0.8 (DI)
ASCENDING AORTA: 4.2 CM
BH CV ECHO MEAS - AI DEC SLOPE: 284.7 CM/SEC^2
BH CV ECHO MEAS - AI MAX PG: 78.2 MMHG
BH CV ECHO MEAS - AI MAX VEL: 442 CM/SEC
BH CV ECHO MEAS - AI P1/2T: 454.8 MSEC
BH CV ECHO MEAS - AO MAX PG (FULL): 2.7 MMHG
BH CV ECHO MEAS - AO MAX PG: 8.9 MMHG
BH CV ECHO MEAS - AO MEAN PG (FULL): 1 MMHG
BH CV ECHO MEAS - AO MEAN PG: 4 MMHG
BH CV ECHO MEAS - AO ROOT AREA (BSA CORRECTED): 1.6
BH CV ECHO MEAS - AO ROOT AREA: 10.8 CM^2
BH CV ECHO MEAS - AO ROOT DIAM: 3.7 CM
BH CV ECHO MEAS - AO V2 MAX: 149 CM/SEC
BH CV ECHO MEAS - AO V2 MEAN: 96.3 CM/SEC
BH CV ECHO MEAS - AO V2 VTI: 31.5 CM
BH CV ECHO MEAS - ASC AORTA: 4.2 CM
BH CV ECHO MEAS - BSA(HAYCOCK): 2.3 M^2
BH CV ECHO MEAS - BSA(HAYCOCK): 2.3 M^2
BH CV ECHO MEAS - BSA: 2.2 M^2
BH CV ECHO MEAS - BSA: 2.2 M^2
BH CV ECHO MEAS - BZI_BMI: 30.8 KILOGRAMS/M^2
BH CV ECHO MEAS - BZI_BMI: 31.7 KILOGRAMS/M^2
BH CV ECHO MEAS - BZI_METRIC_HEIGHT: 180.3 CM
BH CV ECHO MEAS - BZI_METRIC_HEIGHT: 182.9 CM
BH CV ECHO MEAS - BZI_METRIC_WEIGHT: 103 KG
BH CV ECHO MEAS - BZI_METRIC_WEIGHT: 103 KG
BH CV ECHO MEAS - EDV(CUBED): 107.2 ML
BH CV ECHO MEAS - EDV(TEICH): 104.9 ML
BH CV ECHO MEAS - EF(CUBED): 65.9 %
BH CV ECHO MEAS - EF(TEICH): 57.3 %
BH CV ECHO MEAS - EF_3D-VOL: 67 %
BH CV ECHO MEAS - ESV(CUBED): 36.6 ML
BH CV ECHO MEAS - ESV(TEICH): 44.8 ML
BH CV ECHO MEAS - FS: 30.1 %
BH CV ECHO MEAS - IVS/LVPW: 1.1
BH CV ECHO MEAS - IVSD: 1.2 CM
BH CV ECHO MEAS - LA DIMENSION: 3.9 CM
BH CV ECHO MEAS - LA/AO: 1
BH CV ECHO MEAS - LAT PEAK E' VEL: 14.7 CM/SEC
BH CV ECHO MEAS - LV IVRT: 0.13 SEC
BH CV ECHO MEAS - LV MASS(C)D: 204.2 GRAMS
BH CV ECHO MEAS - LV MASS(C)DI: 90.8 GRAMS/M^2
BH CV ECHO MEAS - LV MAX PG: 6.2 MMHG
BH CV ECHO MEAS - LV MEAN PG: 3 MMHG
BH CV ECHO MEAS - LV V1 MAX: 124 CM/SEC
BH CV ECHO MEAS - LV V1 MEAN: 72.4 CM/SEC
BH CV ECHO MEAS - LV V1 VTI: 27.9 CM
BH CV ECHO MEAS - LVIDD: 4.8 CM
BH CV ECHO MEAS - LVIDS: 3.3 CM
BH CV ECHO MEAS - LVPWD: 1.1 CM
BH CV ECHO MEAS - MED PEAK E' VEL: 8.9 CM/SEC
BH CV ECHO MEAS - MV A MAX VEL: 69.8 CM/SEC
BH CV ECHO MEAS - MV DEC SLOPE: 488 CM/SEC^2
BH CV ECHO MEAS - MV DEC TIME: 0.2 SEC
BH CV ECHO MEAS - MV E MAX VEL: 87.2 CM/SEC
BH CV ECHO MEAS - MV E/A: 1.2
BH CV ECHO MEAS - MV MAX PG: 5.7 MMHG
BH CV ECHO MEAS - MV MEAN PG: 2 MMHG
BH CV ECHO MEAS - MV P1/2T MAX VEL: 125 CM/SEC
BH CV ECHO MEAS - MV P1/2T: 75 MSEC
BH CV ECHO MEAS - MV V2 MAX: 119 CM/SEC
BH CV ECHO MEAS - MV V2 MEAN: 62.9 CM/SEC
BH CV ECHO MEAS - MV V2 VTI: 33 CM
BH CV ECHO MEAS - MVA P1/2T LCG: 1.8 CM^2
BH CV ECHO MEAS - MVA(P1/2T): 2.9 CM^2
BH CV ECHO MEAS - PA MAX PG (FULL): -0.35 MMHG
BH CV ECHO MEAS - PA MAX PG: 2.4 MMHG
BH CV ECHO MEAS - PA MEAN PG (FULL): 0 MMHG
BH CV ECHO MEAS - PA MEAN PG: 1 MMHG
BH CV ECHO MEAS - PA V2 MAX: 77.5 CM/SEC
BH CV ECHO MEAS - PA V2 MEAN: 49.8 CM/SEC
BH CV ECHO MEAS - PA V2 VTI: 15.3 CM
BH CV ECHO MEAS - RAP SYSTOLE: 10 MMHG
BH CV ECHO MEAS - RV MAX PG: 2.7 MMHG
BH CV ECHO MEAS - RV MEAN PG: 1 MMHG
BH CV ECHO MEAS - RV V1 MAX: 82.9 CM/SEC
BH CV ECHO MEAS - RV V1 MEAN: 45.8 CM/SEC
BH CV ECHO MEAS - RV V1 VTI: 17.3 CM
BH CV ECHO MEAS - RVDD: 3.8 CM
BH CV ECHO MEAS - RVSP: 16 MMHG
BH CV ECHO MEAS - SI(AO): 150.7 ML/M^2
BH CV ECHO MEAS - SI(CUBED): 31.4 ML/M^2
BH CV ECHO MEAS - SI(TEICH): 26.8 ML/M^2
BH CV ECHO MEAS - SV(AO): 338.7 ML
BH CV ECHO MEAS - SV(CUBED): 70.6 ML
BH CV ECHO MEAS - SV(TEICH): 60.1 ML
BH CV ECHO MEAS - TR MAX VEL: 122 CM/SEC
BH CV ECHO MEASUREMENTS AVERAGE E/E' RATIO: 7.39
BH CV XLRA MEAS LEFT BULB EDV: -18.2 CM/SEC
BH CV XLRA MEAS LEFT BULB PSV: -64.7 CM/SEC
BH CV XLRA MEAS LEFT CCA RATIO VEL: -84.9 CM/SEC
BH CV XLRA MEAS LEFT DIST CCA EDV: -22.8 CM/SEC
BH CV XLRA MEAS LEFT DIST CCA PSV: -85.6 CM/SEC
BH CV XLRA MEAS LEFT DIST ICA EDV: -47.8 CM/SEC
BH CV XLRA MEAS LEFT DIST ICA PSV: -108.7 CM/SEC
BH CV XLRA MEAS LEFT ICA RATIO VEL: -108 CM/SEC
BH CV XLRA MEAS LEFT ICA/CCA RATIO: 1.3
BH CV XLRA MEAS LEFT MID ICA EDV: -22.6 CM/SEC
BH CV XLRA MEAS LEFT MID ICA PSV: -75.4 CM/SEC
BH CV XLRA MEAS LEFT PROX CCA EDV: 39.3 CM/SEC
BH CV XLRA MEAS LEFT PROX CCA PSV: 160.5 CM/SEC
BH CV XLRA MEAS LEFT PROX ECA EDV: -25.1 CM/SEC
BH CV XLRA MEAS LEFT PROX ECA PSV: -118.2 CM/SEC
BH CV XLRA MEAS LEFT PROX ICA EDV: -32.1 CM/SEC
BH CV XLRA MEAS LEFT PROX ICA PSV: -73.5 CM/SEC
BH CV XLRA MEAS LEFT VERTEBRAL A EDV: 20.7 CM/SEC
BH CV XLRA MEAS LEFT VERTEBRAL A PSV: 58.5 CM/SEC
BH CV XLRA MEAS RIGHT BULB EDV: 21.4 CM/SEC
BH CV XLRA MEAS RIGHT BULB PSV: 72.9 CM/SEC
BH CV XLRA MEAS RIGHT CCA RATIO VEL: 67.3 CM/SEC
BH CV XLRA MEAS RIGHT DIST CCA EDV: 20.7 CM/SEC
BH CV XLRA MEAS RIGHT DIST CCA PSV: 67.9 CM/SEC
BH CV XLRA MEAS RIGHT DIST ICA EDV: -61.3 CM/SEC
BH CV XLRA MEAS RIGHT DIST ICA PSV: -132.8 CM/SEC
BH CV XLRA MEAS RIGHT ICA RATIO VEL: -132 CM/SEC
BH CV XLRA MEAS RIGHT ICA/CCA RATIO: -2
BH CV XLRA MEAS RIGHT MID ICA EDV: -34.6 CM/SEC
BH CV XLRA MEAS RIGHT MID ICA PSV: -79.8 CM/SEC
BH CV XLRA MEAS RIGHT PROX CCA EDV: 26.5 CM/SEC
BH CV XLRA MEAS RIGHT PROX CCA PSV: 143.4 CM/SEC
BH CV XLRA MEAS RIGHT PROX ECA EDV: -22 CM/SEC
BH CV XLRA MEAS RIGHT PROX ECA PSV: -101.2 CM/SEC
BH CV XLRA MEAS RIGHT PROX ICA EDV: -33.3 CM/SEC
BH CV XLRA MEAS RIGHT PROX ICA PSV: -70.4 CM/SEC
BH CV XLRA MEAS RIGHT VERTEBRAL A EDV: 23.6 CM/SEC
BH CV XLRA MEAS RIGHT VERTEBRAL A PSV: 65.2 CM/SEC
MAXIMAL PREDICTED HEART RATE: 171 BPM
STRESS TARGET HR: 145 BPM

## 2021-01-18 ENCOUNTER — TELEPHONE (OUTPATIENT)
Dept: CARDIOLOGY | Facility: CLINIC | Age: 51
End: 2021-01-18

## 2021-01-18 NOTE — TELEPHONE ENCOUNTER
Duplex Carotid:  Summary: Nonobstructive carotid disease bilaterally as above. Antegrade flow in both vertebral arteries.    Keep appt tomorrow w/ JR at 8:15am

## 2021-01-18 NOTE — TELEPHONE ENCOUNTER
----- Message from Renea Burns LPN sent at 1/18/2021  9:22 AM EST -----  Regarding: FW:  . Both common carotid arteries are widely patent with relatively high proximal velocities but no significant obstructive disease on either side.     2. Mild bifurcation disease bilaterally, somewhat more prominent on the right, with no hemodynamically significant stenoses by echo or Doppler sampling.     3. 16 to 49% stenosis in the distal portions of both internal carotid arteries.     4. Antegrade flow in both vertebral arteries.  ----- Message -----  From: Yandel Tomas APRN  Sent: 1/17/2021  12:23 AM EST  To: Renea Burns LPN  Subject: FW:                                              Nonobstructive carotid artery disease keep follow up  ----- Message -----  From: Humberto Esparza MD  Sent: 1/16/2021  11:27 PM EST  To: ANITHA Cronin

## 2021-01-19 ENCOUNTER — OFFICE VISIT (OUTPATIENT)
Dept: CARDIOLOGY | Facility: CLINIC | Age: 51
End: 2021-01-19

## 2021-01-19 ENCOUNTER — TELEPHONE (OUTPATIENT)
Dept: CARDIOLOGY | Facility: CLINIC | Age: 51
End: 2021-01-19

## 2021-01-19 VITALS
TEMPERATURE: 96.7 F | HEIGHT: 72 IN | WEIGHT: 222 LBS | BODY MASS INDEX: 30.07 KG/M2 | DIASTOLIC BLOOD PRESSURE: 74 MMHG | SYSTOLIC BLOOD PRESSURE: 116 MMHG | HEART RATE: 74 BPM | OXYGEN SATURATION: 97 %

## 2021-01-19 DIAGNOSIS — R55 SYNCOPE AND COLLAPSE: ICD-10-CM

## 2021-01-19 DIAGNOSIS — I71.21 ASCENDING AORTIC ANEURYSM (HCC): Primary | ICD-10-CM

## 2021-01-19 DIAGNOSIS — R00.2 PALPITATIONS: ICD-10-CM

## 2021-01-19 DIAGNOSIS — R06.02 SHORTNESS OF BREATH: ICD-10-CM

## 2021-01-19 DIAGNOSIS — G44.89 OTHER HEADACHE SYNDROME: ICD-10-CM

## 2021-01-19 PROBLEM — B39.9 HISTOPLASMOSIS: Status: ACTIVE | Noted: 2021-01-19

## 2021-01-19 PROCEDURE — 99214 OFFICE O/P EST MOD 30 MIN: CPT | Performed by: NURSE PRACTITIONER

## 2021-01-19 NOTE — TELEPHONE ENCOUNTER
Discussed while in office to see JESUS WELSH today. Renea Burns LPN    ----- Message from ANITHA Cronin sent at 1/18/2021 11:33 AM EST -----  No acute findings on echocardiogram.  Keep follow-up.  ----- Message -----  From: Humberto Esparza MD  Sent: 1/16/2021  10:31 PM EST  To: ANITHA Cronin

## 2021-01-19 NOTE — PROGRESS NOTES
"Subjective     Amanuel Hayes is a 50 y.o. male who presents to day for Loss of Consciousness (reports \"blacked out\" again, coughing twice last week) and Ascending aortic aneurysm (1-11-21 Carotid u/s, echo and stress ).    CHIEF COMPLIANT  Chief Complaint   Patient presents with   • Loss of Consciousness     reports \"blacked out\" again, coughing twice last week   • Ascending aortic aneurysm     1-11-21 Carotid u/s, echo and stress        Active Problems:  Problem List Items Addressed This Visit        Other    Shortness of breath    Ascending aortic aneurysm (CMS/HCC) - Primary    Palpitations      Other Visit Diagnoses     Syncope and collapse        Relevant Orders    CT Head Without Contrast    Other headache syndrome        Relevant Orders    CT Head Without Contrast            Problem list  1.  Ascending aortic aneurysm discovered December 2019 measuring 4.3 cm  1.1 CT scan of chest 10/20: Abdominal aortic aneurysm 4.3  1.2 echocardiogram 1/21: EF 65%, mild concentric left ventricular hypertrophy with normal diastolic function, mildly dilated left atrium, mild to moderate AI, trivial MR, ascending aorta 4.2 cm  2.  History of cardiac catheterization 2019 in [facility], inadequate data   2.1 stress test 1/21: Negative for ischemia EF 63%  3.  Pulmonary nodules biopsied as histoplasmosis in July  4.  Chronic tobacco use  5. Presyncope:  5.1 carotid duplex 1/21: Nonobstructive carotid artery disease      HPI  HPI   Mr. Hayes is a 50-year-old male patient who is being followed up today for abdominal aortic aneurysm and syncope. Patient did have a CT of his chest and October of last year that showed that his aneurysm was stable at 4.3 cm patient also went under echocardiogram that identified that his aneurysm was 4.2 cm. So therefore it does look to be stable and not growing. We will continue to monitor on a yearly basis. Patient does continue to have episodes of syncope where he has had 2 additional " episodes since his last visit. He says that it usually occurs after coughing around noon to 2. He says he has a coughing spell and then he has syncope he does say that he has a headache that goes along with it. He says that he has actually had a dull headache ever since he had the stress test that is been constant and not relieved with Tylenol. Patient continues to complain of chest pain that feels as a dull pressure in the left side of his chest that is intermittent and occurs weekly basis. He does have associated shortness of air at times it does occur with activity. He says it is commonly associated with coughing fits. When it does happen he has to sit down and rest. He has not taken any nitroglycerin. He says that the pain does get quite bad at times. He also reports palpitations which he describes as racing with an average heart rate of in the nineties he reports. He says this happens about 1 time a week. He did undergo a cardiac event monitor which showed a average heart rate of 90 with no sustained ectopy or dysrhythmia. Palpitations do interfere with his activities of daily life when he does have the palpitations he has to stop what he is doing and rest. He also has chronic shortness of air with associated PND. Orthopnea was reported due to the fact he has to sleep on 3 pillows. He says he does have trouble laying flat. He says when he does become short of breath he has to stop what he was doing and rest and he does have associated cough. He is followed by pulmonology with Dr. Ramirez with questionable sleep apnea. He is scheduled for a CT of his chest tomorrow with Dr. Ramirez. He does report that he had recent labs done at Dr. Parker's office in which we will try to get for further review. He does report occasional swelling in his hands and feet.  PRIOR MEDS  Current Outpatient Medications on File Prior to Visit   Medication Sig Dispense Refill   • albuterol sulfate  (90 Base) MCG/ACT inhaler Inhale 2  puffs Every 4 (Four) Hours As Needed for Wheezing.     • amLODIPine (NORVASC) 5 MG tablet Take 1 tablet by mouth Daily. 30 tablet 11   • Fluticasone Furoate-Vilanterol (BREO ELLIPTA) 100-25 MCG/INH inhaler Inhale 1 puff Daily.     • gabapentin (NEURONTIN) 800 MG tablet Take 800 mg by mouth 3 (Three) Times a Day.     • nitroglycerin (NITROSTAT) 0.4 MG SL tablet 1 under the tongue as needed for angina, may repeat q5mins for up three doses 25 tablet 11   • oxyCODONE-acetaminophen (PERCOCET) 7.5-325 MG per tablet Take 1 tablet by mouth 4 (Four) Times a Day.     • tiZANidine (ZANAFLEX) 4 MG tablet Take 4 mg by mouth At Night As Needed for Muscle Spasms.     • traZODone (DESYREL) 50 MG tablet Take 50 mg by mouth Take As Directed. Half a tab at HS     • Umeclidinium Bromide (INCRUSE ELLIPTA) 62.5 MCG/INH aerosol powder  Inhale Daily.     • [DISCONTINUED] fluconazole (DIFLUCAN) 200 MG tablet Take 200 mg by mouth Daily.       No current facility-administered medications on file prior to visit.        ALLERGIES  Penicillins, Neosporin [neomycin-bacitracin zn-polymyx], and Sulfa antibiotics    HISTORY  Past Medical History:   Diagnosis Date   • Aneurysm (CMS/[facility])    • Asthma    • Cancer (CMS/[facility])     has nodule on b/l lungs/    • Coma (CMS/[facility]) 1999   • COPD (chronic obstructive pulmonary disease) (CMS/[facility])    • Histoplasmosis 2020   • Hypertension        Social History     Socioeconomic History   • Marital status:      Spouse name: Not on file   • Number of children: Not on file   • Years of education: Not on file   • Highest education level: Not on file   Tobacco Use   • Smoking status: Current Every Day Smoker     Packs/day: 1.00     Types: Cigarettes   • Smokeless tobacco: Never Used   Substance and Sexual Activity   • Alcohol use: Yes   • Drug use: Defer   • Sexual activity: Defer       Family History   Problem Relation Age of Onset   • Cancer Mother    • Heart failure Mother    • Cancer Father    • Cancer Maternal  "Aunt    • Cancer Maternal Uncle    • Cancer Paternal Aunt    • Cancer Paternal Uncle    • Cancer Maternal Grandmother    • Cancer Maternal Grandfather    • Cancer Paternal Grandmother    • Cancer Paternal Grandfather        Review of Systems   Constitutional: Positive for fatigue. Negative for chills and fever.   HENT: Positive for postnasal drip, rhinorrhea and sinus pressure. Negative for congestion and sore throat.    Eyes: Negative.  Negative for visual disturbance.   Respiratory: Positive for chest tightness (at times) and shortness of breath.    Cardiovascular: Positive for chest pain (at times), palpitations (races) and leg swelling (feet and hands).   Gastrointestinal: Negative.  Negative for abdominal pain, blood in stool, constipation, diarrhea, nausea and vomiting.   Endocrine: Negative.  Negative for cold intolerance and heat intolerance.   Genitourinary: Negative for dysuria, frequency, hematuria and urgency.   Musculoskeletal: Positive for arthralgias, back pain and neck pain.   Skin: Positive for wound (scratches). Negative for rash.   Allergic/Immunologic: Positive for environmental allergies. Negative for food allergies.   Neurological: Positive for dizziness, syncope (twice last week while coughing extremley hard) and light-headedness.   Hematological: Bruises/bleeds easily.   Psychiatric/Behavioral: Positive for sleep disturbance (reports wakes up nightly soa, gasping for air, cp at times).       Objective     VITALS: /74 (BP Location: Left arm, Patient Position: Sitting)   Pulse 74   Temp 96.7 °F (35.9 °C)   Ht 182.9 cm (72\")   Wt 101 kg (222 lb)   SpO2 97%   BMI 30.11 kg/m²     LABS:   Lab Results (most recent)     None          IMAGING:   No Images in the past 120 days found..    EXAM:  Physical Exam  Vitals signs and nursing note reviewed.   Constitutional:       Appearance: He is well-developed.   HENT:      Head: Normocephalic and atraumatic.   Eyes:      Pupils: Pupils are " equal, round, and reactive to light.   Neck:      Musculoskeletal: Neck supple.      Vascular: No carotid bruit or JVD.   Cardiovascular:      Rate and Rhythm: Normal rate and regular rhythm.      Pulses:           Carotid pulses are 2+ on the right side and 2+ on the left side.       Radial pulses are 2+ on the right side and 2+ on the left side.        Posterior tibial pulses are 2+ on the right side and 2+ on the left side.      Heart sounds: Murmur present. No gallop.    Pulmonary:      Effort: Pulmonary effort is normal. No respiratory distress.      Breath sounds: Wheezing and rhonchi present.   Abdominal:      General: Bowel sounds are normal. There is no distension.      Palpations: Abdomen is soft.      Tenderness: There is no abdominal tenderness.   Musculoskeletal: Normal range of motion.         General: No swelling.   Skin:     General: Skin is warm and dry.   Neurological:      Mental Status: He is alert and oriented to person, place, and time.      Cranial Nerves: No cranial nerve deficit.      Sensory: No sensory deficit.   Psychiatric:         Speech: Speech normal.         Behavior: Behavior normal.         Thought Content: Thought content normal.         Judgment: Judgment normal.         Procedure   Procedures       Assessment/Plan    Diagnosis Plan   1. Ascending aortic aneurysm (CMS/HCC)     2. Shortness of breath     3. Palpitations     4. Syncope and collapse  CT Head Without Contrast   5. Other headache syndrome  CT Head Without Contrast   1. Patient does have a ascending aortic aneurysm which appears to be stable at 4.3 cm. We will continue to monitor.  2. Patient continues to have shortness of breath despite negative stress test and relatively unremarkable echocardiogram other than moderate AI. We will also continue to monitor this. He is being followed by Dr. Ramirez in which he follows up this week with a CT scan. Patient did have some wheezing on auscultation and I feel that some of his  shortness of breath is contributed to a pulmonary cause.  3. Patient continues to have syncopal episodes which she said syncopal episodes x2 he had negative carotid artery ultrasound which ruled out carotid artery disease however due to the syncope which is largely associated with cough I do think it is appropriate to do a CT of his head to rule out other potential causes of his syncope as well as his persistent headache.  4. Patient's blood pressure is well controlled on current blood pressure medication regimen.  No medication changes are warranted at this time.  Patient advised to monitor blood pressure on a daily basis and report any persistent highs or lows.  Set goal blood pressure for patient at 130/80 or below.  5. Informed of signs and symptoms of ACS and advised to seek emergent treatment for any new worsening symptoms.  Patient also advised sooner follow-up as needed.  Also advised to follow-up with family doctor as needed  This note is dictated utilizing voice recognition software.  Although this record has been proof read, transcriptional errors may still be present. If questions occur regarding the content of this record please do not hesitate to call our office.  I have reviewed and confirmed the accuracy of the ROS as documented by the MA/LPN/RN ANITHA Cronin    No follow-ups on file.    Diagnoses and all orders for this visit:    1. Ascending aortic aneurysm (CMS/HCC) (Primary)    2. Shortness of breath    3. Palpitations    4. Syncope and collapse  -     CT Head Without Contrast; Future    5. Other headache syndrome  -     CT Head Without Contrast; Future        Amanuel VALENTIN Freddy  reports that he has been smoking cigarettes. He has been smoking about 1.00 pack per day. He has never used smokeless tobacco.. I have educated him on the risk of diseases from using tobacco products such as cancer, COPD and heart disease.     I advised him to quit and he is willing to quit. We have discussed the  following method/s for tobacco cessation:  Counseling.      I spent 3  minutes counseling the patient.           Patient's Body mass index is 30.11 kg/m². BMI is above normal parameters. Recommendations include: educational material.           MEDS ORDERED DURING VISIT:  No orders of the defined types were placed in this encounter.          This document has been electronically signed by Yandel Tomas Jr., ANITHA  January 19, 2021 10:07 EST

## 2021-01-19 NOTE — PATIENT INSTRUCTIONS
Steps to Quit Smoking  Smoking tobacco is the leading cause of preventable death. It can affect almost every organ in the body. Smoking puts you and people around you at risk for many serious, long-lasting (chronic) diseases. Quitting smoking can be hard, but it is one of the best things that you can do for your health. It is never too late to quit.  How do I get ready to quit?  When you decide to quit smoking, make a plan to help you succeed. Before you quit:  · Pick a date to quit. Set a date within the next 2 weeks to give you time to prepare.  · Write down the reasons why you are quitting. Keep this list in places where you will see it often.  · Tell your family, friends, and co-workers that you are quitting. Their support is important.  · Talk with your doctor about the choices that may help you quit.  · Find out if your health insurance will pay for these treatments.  · Know the people, places, things, and activities that make you want to smoke (triggers). Avoid them.  What first steps can I take to quit smoking?  · Throw away all cigarettes at home, at work, and in your car.  · Throw away the things that you use when you smoke, such as ashtrays and lighters.  · Clean your car. Make sure to empty the ashtray.  · Clean your home, including curtains and carpets.  What can I do to help me quit smoking?  Talk with your doctor about taking medicines and seeing a counselor at the same time. You are more likely to succeed when you do both.  · If you are pregnant or breastfeeding, talk with your doctor about counseling or other ways to quit smoking. Do not take medicine to help you quit smoking unless your doctor tells you to do so.  To quit smoking:  Quit right away  · Quit smoking totally, instead of slowly cutting back on how much you smoke over a period of time.  · Go to counseling. You are more likely to quit if you go to counseling sessions regularly.  Take medicine  You may take medicines to help you quit. Some  medicines need a prescription, and some you can buy over-the-counter. Some medicines may contain a drug called nicotine to replace the nicotine in cigarettes. Medicines may:  · Help you to stop having the desire to smoke (cravings).  · Help to stop the problems that come when you stop smoking (withdrawal symptoms).  Your doctor may ask you to use:  · Nicotine patches, gum, or lozenges.  · Nicotine inhalers or sprays.  · Non-nicotine medicine that is taken by mouth.  Find resources  Find resources and other ways to help you quit smoking and remain smoke-free after you quit. These resources are most helpful when you use them often. They include:  · Online chats with a counselor.  · Phone quitlines.  · Printed self-help materials.  · Support groups or group counseling.  · Text messaging programs.  · Mobile phone apps. Use apps on your mobile phone or tablet that can help you stick to your quit plan. There are many free apps for mobile phones and tablets as well as websites. Examples include Quit Guide from the CDC and smokefree.gov    What things can I do to make it easier to quit?    · Talk to your family and friends. Ask them to support and encourage you.  · Call a phone quitline (7-138-QUITNOW), reach out to support groups, or work with a counselor.  · Ask people who smoke to not smoke around you.  · Avoid places that make you want to smoke, such as:  ? Bars.  ? Parties.  ? Smoke-break areas at work.  · Spend time with people who do not smoke.  · Lower the stress in your life. Stress can make you want to smoke. Try these things to help your stress:  ? Getting regular exercise.  ? Doing deep-breathing exercises.  ? Doing yoga.  ? Meditating.  ? Doing a body scan. To do this, close your eyes, focus on one area of your body at a time from head to toe. Notice which parts of your body are tense. Try to relax the muscles in those areas.  How will I feel when I quit smoking?  Day 1 to 3 weeks  Within the first 24 hours,  you may start to have some problems that come from quitting tobacco. These problems are very bad 2-3 days after you quit, but they do not often last for more than 2-3 weeks. You may get these symptoms:  · Mood swings.  · Feeling restless, nervous, angry, or annoyed.  · Trouble concentrating.  · Dizziness.  · Strong desire for high-sugar foods and nicotine.  · Weight gain.  · Trouble pooping (constipation).  · Feeling like you may vomit (nausea).  · Coughing or a sore throat.  · Changes in how the medicines that you take for other issues work in your body.  · Depression.  · Trouble sleeping (insomnia).  Week 3 and afterward  After the first 2-3 weeks of quitting, you may start to notice more positive results, such as:  · Better sense of smell and taste.  · Less coughing and sore throat.  · Slower heart rate.  · Lower blood pressure.  · Clearer skin.  · Better breathing.  · Fewer sick days.  Quitting smoking can be hard. Do not give up if you fail the first time. Some people need to try a few times before they succeed. Do your best to stick to your quit plan, and talk with your doctor if you have any questions or concerns.  Summary  · Smoking tobacco is the leading cause of preventable death. Quitting smoking can be hard, but it is one of the best things that you can do for your health.  · When you decide to quit smoking, make a plan to help you succeed.  · Quit smoking right away, not slowly over a period of time.  · When you start quitting, seek help from your doctor, family, or friends.  This information is not intended to replace advice given to you by your health care provider. Make sure you discuss any questions you have with your health care provider.  Document Revised: 09/11/2020 Document Reviewed: 03/07/2020  Elsevier Patient Education © 2020 [employer]  BMI for Adults  What is BMI?  Body mass index (BMI) is a number that is calculated from a person's weight and height. BMI can help estimate how much of a  "person's weight is composed of fat. BMI does not measure body fat directly. Rather, it is an alternative to procedures that directly measure body fat, which can be difficult and expensive.  BMI can help identify people who may be at higher risk for certain medical problems.  What are BMI measurements used for?  BMI is used as a screening tool to identify possible weight problems. It helps determine whether a person is obese, overweight, a healthy weight, or underweight.  BMI is useful for:  · Identifying a weight problem that may be related to a medical condition or may increase the risk for medical problems.  · Promoting changes, such as changes in diet and exercise, to help reach a healthy weight. BMI screening can be repeated to see if these changes are working.  How is BMI calculated?  BMI involves measuring your weight in relation to your height. Both height and weight are measured, and the BMI is calculated from those numbers. This can be done either in English ([place] or metric measurements. Note that charts and online BMI calculators are available to help you find your BMI quickly and easily without having to do these calculations yourself.  To calculate your BMI in English ([place] measurements:    1. Measure your weight in pounds (lb).  2. Multiply the number of pounds by 703.  ? For example, for a person who weighs 180 lb, multiply that number by 703, which equals 126,540.  3. Measure your height in inches. Then multiply that number by itself to get a measurement called \"inches squared.\"  ? For example, for a person who is 70 inches tall, the \"inches squared\" measurement is 70 inches x 70 inches, which equals 4,900 inches squared.  4. Divide the total from step 2 (number of lb x 703) by the total from step 3 (inches squared): 126,540 ÷ 4,900 = 25.8. This is your BMI.  To calculate your BMI in metric measurements:  1. Measure your weight in kilograms (kg).  2. Measure your height in meters (m). Then multiply " "that number by itself to get a measurement called \"meters squared.\"  ? For example, for a person who is 1.75 m tall, the \"meters squared\" measurement is 1.75 m x 1.75 m, which is equal to 3.1 meters squared.  3. Divide the number of kilograms (your weight) by the meters squared number. In this example: 70 ÷ 3.1 = 22.6. This is your BMI.  What do the results mean?  BMI charts are used to identify whether you are underweight, normal weight, overweight, or obese. The following guidelines will be used:  · Underweight: BMI less than 18.5.  · Normal weight: BMI between 18.5 and 24.9.  · Overweight: BMI between 25 and 29.9.  · Obese: BMI of 30 or above.  Keep these notes in mind:  · Weight includes both fat and muscle, so someone with a muscular build, such as an athlete, may have a BMI that is higher than 24.9. In cases like these, BMI is not an accurate measure of body fat.  · To determine if excess body fat is the cause of a BMI of 25 or higher, further assessments may need to be done by a health care provider.  · BMI is usually interpreted in the same way for men and women.  Where to find more information  For more information about BMI, including tools to quickly calculate your BMI, go to these websites:  · Centers for Disease Control and Prevention: www.cdc.gov  · American Heart Association: www.heart.org  · National Heart, Lung, and Blood [place]: www.nhlbi.nih.gov  Summary  · Body mass index (BMI) is a number that is calculated from a person's weight and height.  · BMI may help estimate how much of a person's weight is composed of fat. BMI can help identify those who may be at higher risk for certain medical problems.  · BMI can be measured using English measurements or metric measurements.  · BMI charts are used to identify whether you are underweight, normal weight, overweight, or obese.  This information is not intended to replace advice given to you by your health care provider. Make sure you discuss any " questions you have with your health care provider.  Document Revised: 09/09/2020 Document Reviewed: 07/17/2020  Elsevier Patient Education © 2020 [employer]    Acute Coronary Syndrome  Acute coronary syndrome (ACS) is a serious problem in which there is suddenly not enough blood and oxygen reaching the heart. ACS can result in chest pain or a heart attack.  This condition is a medical emergency. If you have any symptoms of this condition, get help right away.  What are the causes?  This condition may be caused by:  · A buildup of fat and cholesterol inside the arteries (atherosclerosis). This is the most common cause. The buildup (plaque) can cause blood vessels in the heart (coronary arteries) to become narrow or blocked, which reduces blood flow to the heart. Plaque can also break off and lead to a clot, which can block an artery and cause a heart attack or stroke.  · Sudden tightening of the muscles around the coronary arteries (coronary spasm).  · Tearing of a coronary artery (spontaneous coronary artery dissection).  · Very low blood pressure (hypotension).  · An abnormal heartbeat (arrhythmia).  · Other medical conditions that cause a decrease of oxygen to the heart, such as anemiaorrespiratory failure.  · Using cocaine or methamphetamine.  What increases the risk?  The following factors may make you more likely to develop this condition:  · Age. The risk for ACS increases as you get older.  · History of chest pain, heart attack, peripheral artery disease, or stroke.  · Having taken chemotherapy or immune-suppressing medicines.  · Being male.  · Family history of chest pain, heart disease, or stroke.  · Smoking.  · Not exercising enough.  · Being overweight.  · High cholesterol.  · High blood pressure (hypertension).  · Diabetes.  · Excessive alcohol use.  What are the signs or symptoms?  Common symptoms of this condition include:  · Chest pain. The pain may last a long time, or it may stop and come back  (recur). It may feel like:  ? Crushing or squeezing.  ? Tightness, pressure, fullness, or heaviness.  · Arm, neck, jaw, or back pain.  · Heartburn or indigestion.  · Shortness of breath.  · Nausea.  · Sudden cold sweats.  · Light-headedness.  · Dizziness or passing out.  · Tiredness (fatigue).  Sometimes there are no symptoms.  How is this diagnosed?  This condition may be diagnosed based on:  · Your medical history and symptoms.  · Imaging tests, such as:  ? An electrocardiogram (ECG). This measures the heart's electrical activity.  ? X-rays.  ? CT scan.  ? A coronary angiogram. For this test, dye is injected into the heart arteries and then X-rays are taken.  ? Myocardial perfusion imaging. This test shows how well blood flows through your heart muscle.  · Blood tests. These may be repeated at certain time intervals.  · Exercise stress testing.  · Echocardiogram. This is a test that uses sound waves to produce detailed images of the heart.  How is this treated?  Treatment for this condition may include:  · Oxygen therapy.  · Medicines, such as:  ? Antiplatelet medicines and blood-thinning medicines, such as aspirin. These help prevent blood clots.  ? Medicine that dissolves any blood clots (fibrinolytic therapy).  ? Blood pressure medicines.  ? Nitroglycerin. This helps widen blood vessels to improve blood flow.  ? Pain medicine.  ? Cholesterol-lowering medicine.  · Surgery, such as:  ? Coronary angioplasty with stent placement. This involves placing a small piece of metal that looks like mesh or a spring into a narrow coronary artery. This widens the artery and keeps it open.  ? Coronary artery bypass surgery. This involves taking a section of a blood vessel from a different part of your body and placing it on the blocked coronary artery to allow blood to flow around the blockage.  · Cardiac rehabilitation. This is a program that includes exercise training, education, and counseling to help you recover.  Follow  these instructions at home:  Eating and drinking  · Eat a heart-healthy diet that includes whole grains, fruits and vegetables, lean proteins, and low-fat or nonfat dairy products.  · Limit how much salt (sodium) you eat as told by your health care provider. Follow instructions from your health care provider about any other eating or drinking restrictions, such as limiting foods that are high in fat and processed sugars.  · Use healthy cooking methods such as roasting, grilling, broiling, baking, poaching, steaming, or stir-frying.  · Work with a dietitian to follow a heart-healthy eating plan.  Medicines  · Take over-the-counter and prescription medicines only as told by your health care provider.  · Do not take these medicines unless your health care provider approves:  ? Vitamin supplements that contain vitamin A or vitamin E.  ? NSAIDs, such as ibuprofen, naproxen, or celecoxib.  ? Hormone replacement therapy that contains estrogen.  · If you are taking blood thinners:  ? Talk with your health care provider before you take any medicines that contain aspirin or NSAIDs. These medicines increase your risk for dangerous bleeding.  ? Take your medicine exactly as told, at the same time every day.  ? Avoid activities that could cause injury or bruising, and follow instructions about how to prevent falls.  ? Wear a medical alert bracelet, and carry a card that lists what medicines you take.  Activity  · Follow your cardiac rehabilitation program. Do exercises as told by your physical therapist.  · Ask your health care provider what activities and exercises are safe for you. Follow his or her instructions about lifting, driving, or climbing stairs.  Lifestyle  · Do not use any products that contain nicotine or tobacco, such as cigarettes, e-cigarettes, and chewing tobacco. If you need help quitting, ask your health care provider.  · Do not drink alcohol if your health care provider tells you not to drink.  · If you  drink alcohol:  ? Limit how much you have to 0-1 drink a day.  ? Be aware of how much alcohol is in your drink. In the [place] one drink equals one 12 oz bottle of beer (355 mL), one 5 oz glass of wine (148 mL), or one 1½ oz glass of hard liquor (44 mL).  · Maintain a healthy weight. If you need to lose weight, work with your health care provider to do so safely.  General instructions  · Tell all the health care providers who provide care for you about your heart condition, including your dentist. This may affect the medicines or treatment you receive.  · Manage any other health conditions you have, such as hypertension or diabetes. These conditions affect your heart.  · Pay attention to your mental health. You may be at higher risk for depression.  ? Find ways to manage stress.  ? Talk to your health care provider about depression screening and treatment.  · Keep your vaccinations up to date.  ? Get the flu shot (influenza vaccine) every year.  ? Get the pneumococcal vaccine if you are age 65 or older.  · If directed, monitor your blood pressure at home.  · Keep all follow-up visits as told by your health care provider. This is important.  Contact a health care provider if you:  · Feel overwhelmed or sad.  · Have trouble doing your daily activities.  Get help right away if you:  · Have pain in your chest, neck, arm, jaw, stomach, or back that recurs, and:  ? It lasts for more than a few minutes.  ? It is not relieved by taking the medicineyour health care provider prescribed.  · Have unexplained:  ? Heavy sweating.  ? Heartburn or indigestion.  ? Nausea or vomiting.  ? Shortness of breath.  ? Difficulty breathing.  ? Fatigue.  ? Nervousness or anxiety.  ? Weakness.  ? Diarrhea.  ? Dark stools or blood in your stool.  · Have sudden light-headedness or dizziness.  · Have blood pressure that is higher than 180/120.  · Faint.  · Have thoughts about hurting yourself.  These symptoms may represent a serious problem that is  an emergency. Do not wait to see if the symptoms will go away. Get medical help right away. Call your local emergency services (911 in the [place] Do not drive yourself to the hospital.   Summary  · Acute coronary syndrome (ACS) is when there is not enough blood and oxygen being supplied to the heart. ACS can result in chest pain or a heart attack.  · Acute coronary syndrome is a medical emergency. If you have any symptoms of this condition, get help right away.  · Treatment includes medicines and procedures to open the blocked arteries and restore blood flow.  This information is not intended to replace advice given to you by your health care provider. Make sure you discuss any questions you have with your health care provider.  Document Revised: 05/20/2020 Document Reviewed: 12/30/2019  [employer] Patient Education © 2020 [employer]      Palpitations  Palpitations are feelings that your heartbeat is not normal. Your heartbeat may feel like it is:  · Uneven.  · Faster than normal.  · Fluttering.  · Skipping a beat.  This is usually not a serious problem. In some cases, you may need tests to rule out any serious problems.  Follow these instructions at home:  Pay attention to any changes in your condition. Take these actions to help manage your symptoms:  Eating and drinking  · Avoid:  ? Coffee, tea, soft drinks, and energy drinks.  ? Chocolate.  ? Alcohol.  ? Diet pills.  Lifestyle    · Try to lower your stress. These things can help you relax:  ? Yoga.  ? Deep breathing and meditation.  ? Exercise.  ? Using words and images to create positive thoughts (guided imagery).  ? Using your mind to control things in your body (biofeedback).  · Do not use drugs.  · Get plenty of rest and sleep. Keep a regular bed time.  General instructions    · Take over-the-counter and prescription medicines only as told by your doctor.  · Do not use any products that contain nicotine or tobacco, such as cigarettes and e-cigarettes. If  you need help quitting, ask your doctor.  · Keep all follow-up visits as told by your doctor. This is important. You may need more tests if palpitations do not go away or get worse.  Contact a doctor if:  · Your symptoms last more than 24 hours.  · Your symptoms occur more often.  Get help right away if you:  · Have chest pain.  · Feel short of breath.  · Have a very bad headache.  · Feel dizzy.  · Pass out (faint).  Summary  · Palpitations are feelings that your heartbeat is uneven or faster than normal. It may feel like your heart is fluttering or skipping a beat.  · Avoid food and drinks that may cause palpitations. These include caffeine, chocolate, and alcohol.  · Try to lower your stress. Do not smoke or use drugs.  · Get help right away if you faint or have chest pain, shortness of breath, a severe headache, or dizziness.  This information is not intended to replace advice given to you by your health care provider. Make sure you discuss any questions you have with your health care provider.  Document Revised: 01/30/2019 Document Reviewed: 01/30/2019  [employer] Patient Education © 2020 [employer]      Hypertension, Adult  Hypertension is another name for high blood pressure. High blood pressure forces your heart to work harder to pump blood. This can cause problems over time.  There are two numbers in a blood pressure reading. There is a top number (systolic) over a bottom number (diastolic). It is best to have a blood pressure that is below 120/80. Healthy choices can help lower your blood pressure, or you may need medicine to help lower it.  What are the causes?  The cause of this condition is not known. Some conditions may be related to high blood pressure.  What increases the risk?  · Smoking.  · Having type 2 diabetes mellitus, high cholesterol, or both.  · Not getting enough exercise or physical activity.  · Being overweight.  · Having too much fat, sugar, calories, or salt (sodium) in your  diet.  · Drinking too much alcohol.  · Having long-term (chronic) kidney disease.  · Having a family history of high blood pressure.  · Age. Risk increases with age.  · Race. You may be at higher risk if you are .  · Gender. Men are at higher risk than women before age 45. After age 65, women are at higher risk than men.  · Having obstructive sleep apnea.  · Stress.  What are the signs or symptoms?  · High blood pressure may not cause symptoms. Very high blood pressure (hypertensive crisis) may cause:  ? Headache.  ? Feelings of worry or nervousness (anxiety).  ? Shortness of breath.  ? Nosebleed.  ? A feeling of being sick to your stomach (nausea).  ? Throwing up (vomiting).  ? Changes in how you see.  ? Very bad chest pain.  ? Seizures.  How is this treated?  · This condition is treated by making healthy lifestyle changes, such as:  ? Eating healthy foods.  ? Exercising more.  ? Drinking less alcohol.  · Your health care provider may prescribe medicine if lifestyle changes are not enough to get your blood pressure under control, and if:  ? Your top number is above 130.  ? Your bottom number is above 80.  · Your personal target blood pressure may vary.  Follow these instructions at home:  Eating and drinking    · If told, follow the DASH eating plan. To follow this plan:  ? Fill one half of your plate at each meal with fruits and vegetables.  ? Fill one fourth of your plate at each meal with whole grains. Whole grains include whole-wheat pasta, brown rice, and whole-grain bread.  ? Eat or drink low-fat dairy products, such as skim milk or low-fat yogurt.  ? Fill one fourth of your plate at each meal with low-fat (lean) proteins. Low-fat proteins include fish, chicken without skin, eggs, beans, and tofu.  ? Avoid fatty meat, cured and processed meat, or chicken with skin.  ? Avoid pre-made or processed food.  · Eat less than 1,500 mg of salt each day.  · Do not drink alcohol if:  ? Your doctor tells  you not to drink.  ? You are pregnant, may be pregnant, or are planning to become pregnant.  · If you drink alcohol:  ? Limit how much you use to:  § 0-1 drink a day for women.  § 0-2 drinks a day for men.  ? Be aware of how much alcohol is in your drink. In the [place] one drink equals one 12 oz bottle of beer (355 mL), one 5 oz glass of wine (148 mL), or one 1½ oz glass of hard liquor (44 mL).  Lifestyle    · Work with your doctor to stay at a healthy weight or to lose weight. Ask your doctor what the best weight is for you.  · Get at least 30 minutes of exercise most days of the week. This may include walking, swimming, or biking.  · Get at least 30 minutes of exercise that strengthens your muscles (resistance exercise) at least 3 days a week. This may include lifting weights or doing Pilates.  · Do not use any products that contain nicotine or tobacco, such as cigarettes, e-cigarettes, and chewing tobacco. If you need help quitting, ask your doctor.  · Check your blood pressure at home as told by your doctor.  · Keep all follow-up visits as told by your doctor. This is important.  Medicines  · Take over-the-counter and prescription medicines only as told by your doctor. Follow directions carefully.  · Do not skip doses of blood pressure medicine. The medicine does not work as well if you skip doses. Skipping doses also puts you at risk for problems.  · Ask your doctor about side effects or reactions to medicines that you should watch for.  Contact a doctor if you:  · Think you are having a reaction to the medicine you are taking.  · Have headaches that keep coming back (recurring).  · Feel dizzy.  · Have swelling in your ankles.  · Have trouble with your vision.  Get help right away if you:  · Get a very bad headache.  · Start to feel mixed up (confused).  · Feel weak or numb.  · Feel faint.  · Have very bad pain in your:  ? Chest.  ? Belly (abdomen).  · Throw up more than once.  · Have trouble  breathing.  Summary  · Hypertension is another name for high blood pressure.  · High blood pressure forces your heart to work harder to pump blood.  · For most people, a normal blood pressure is less than 120/80.  · Making healthy choices can help lower blood pressure. If your blood pressure does not get lower with healthy choices, you may need to take medicine.  This information is not intended to replace advice given to you by your health care provider. Make sure you discuss any questions you have with your health care provider.  Document Revised: 08/28/2019 Document Reviewed: 08/28/2019  [employer]vier Patient Education © 2020 [employer]

## 2021-01-25 ENCOUNTER — TELEPHONE (OUTPATIENT)
Dept: CARDIOLOGY | Facility: CLINIC | Age: 51
End: 2021-01-25

## 2021-01-25 NOTE — TELEPHONE ENCOUNTER
Patient informed of CT results. Copy faxed to pcp, Dr Parker. Patient verbalized understanding. Renea Burns LPN    ----- Message from ANITHA Cronin sent at 1/25/2021  8:19 AM EST -----  No acute findings on CT of the head.  Keep follow-up.  He is forward to PCP.  ----- Message -----  From: Sunshine Banks RegSched Rep  Sent: 1/21/2021  11:07 AM EST  To: ANITHA Cronin